# Patient Record
Sex: FEMALE | Race: WHITE | NOT HISPANIC OR LATINO | Employment: FULL TIME | URBAN - METROPOLITAN AREA
[De-identification: names, ages, dates, MRNs, and addresses within clinical notes are randomized per-mention and may not be internally consistent; named-entity substitution may affect disease eponyms.]

---

## 2018-02-20 ENCOUNTER — HOSPITAL ENCOUNTER (EMERGENCY)
Facility: HOSPITAL | Age: 73
Discharge: HOME | End: 2018-02-20
Attending: EMERGENCY MEDICINE | Admitting: EMERGENCY MEDICINE

## 2018-02-20 ENCOUNTER — APPOINTMENT (OUTPATIENT)
Dept: CT IMAGING | Facility: HOSPITAL | Age: 73
End: 2018-02-20

## 2018-02-20 ENCOUNTER — APPOINTMENT (OUTPATIENT)
Dept: GENERAL RADIOLOGY | Facility: HOSPITAL | Age: 73
End: 2018-02-20

## 2018-02-20 VITALS
OXYGEN SATURATION: 96 % | DIASTOLIC BLOOD PRESSURE: 60 MMHG | TEMPERATURE: 98.3 F | HEART RATE: 88 BPM | HEIGHT: 65 IN | RESPIRATION RATE: 18 BRPM | SYSTOLIC BLOOD PRESSURE: 138 MMHG | BODY MASS INDEX: 34.82 KG/M2 | WEIGHT: 209 LBS

## 2018-02-20 DIAGNOSIS — S80.01XA CONTUSION OF RIGHT KNEE, INITIAL ENCOUNTER: Primary | ICD-10-CM

## 2018-02-20 PROCEDURE — 73560 X-RAY EXAM OF KNEE 1 OR 2: CPT

## 2018-02-20 PROCEDURE — 99283 EMERGENCY DEPT VISIT LOW MDM: CPT

## 2018-02-20 PROCEDURE — 73030 X-RAY EXAM OF SHOULDER: CPT

## 2018-02-20 PROCEDURE — 73700 CT LOWER EXTREMITY W/O DYE: CPT

## 2018-02-20 RX ORDER — LISINOPRIL 2.5 MG/1
2.5 TABLET ORAL DAILY
COMMUNITY

## 2018-02-20 NOTE — ED PROVIDER NOTES
Subjective   HPI Comments: Liana Edwards is a 72 y.o.female who presents to the emergency department with complaints of right knee and shoulder pain after a fall that occurred three weeks ago. The patient explains that she had flipped a table over and was pushing it across the floor at home when the leg of the table folded inward. The patient fell forward and landed on her hands and knees. Since the fall, the patient has been using a cane to ambulate and has been unable to bear weight on the right leg for a prolonged period of time. When asked why the patient has not been evaluated yet for her injuries, the patient states that she was hoping her pain would go away on its own. She came here for evaluation when her symptoms did not improve. She has a knee brace in place on arrival to the emergency department. There are no other acute complaints at this time.      Patient is a 72 y.o. female presenting with fall.   History provided by:  Patient  Fall   Mechanism of injury: fall    Injury location:  Leg and shoulder/arm  Shoulder/arm injury location:  R shoulder  Leg injury location:  R knee  Incident location:  Home  Time since incident: three weeks.  Arrived directly from scene: no    Fall:     Fall occurred: pushing a table.    Impact surface:  Unable to specify    Point of impact:  Hands and knees    Entrapped after fall: no    Suspicion of alcohol use: no    Suspicion of drug use: no    Prior to arrival data:     Patient ambulatory at scene: yes      Loss of consciousness: no      Amnesic to event: no      Immobilization: The patient is wearing a knee brace on arrival.  Associated symptoms: no loss of consciousness        Review of Systems   Musculoskeletal: Positive for arthralgias (right knee and shoulder) and gait problem (secondary to pain in the right knee).   Neurological: Negative for loss of consciousness.       Past Medical History:   Diagnosis Date   • Cancer    • Hypertension        Allergies    Allergen Reactions   • Codeine Anaphylaxis       Past Surgical History:   Procedure Laterality Date   • APPENDECTOMY     • HYSTERECTOMY         History reviewed. No pertinent family history.    Social History     Social History   • Marital status:      Spouse name: N/A   • Number of children: N/A   • Years of education: N/A     Social History Main Topics   • Smoking status: Never Smoker   • Smokeless tobacco: Never Used   • Alcohol use No   • Drug use: No   • Sexual activity: Defer     Other Topics Concern   • None     Social History Narrative   • None         Objective   Physical Exam   Constitutional: She is oriented to person, place, and time. She appears well-developed and well-nourished. No distress.   HENT:   Head: Normocephalic and atraumatic.   Nose: Nose normal.   Airway patent.   Eyes: Conjunctivae are normal. No scleral icterus.   Neck: Normal range of motion and phonation normal. Neck supple.   Pulmonary/Chest: Effort normal. No respiratory distress.   Musculoskeletal: Normal range of motion. She exhibits tenderness. She exhibits no deformity.   No tenderness appreciated in the right shoulder. Normal range of motion of the right shoulder without pain. The right knee is mildly tender over the medial proximal tibia. No deformity. No swelling. No step offs. Normal range of motion of the right knee without pain. No joint laxity. No effusion.    Neurological: She is alert and oriented to person, place, and time.   Skin: Skin is warm and dry.   Psychiatric: She has a normal mood and affect. Her behavior is normal.   Nursing note and vitals reviewed.      Procedures         ED Course  ED Course     XRs negative.  CT leg negative.  Pt refused knee immobilizer, has a cane and says that is working well enough.  Patient stable on serial rechecks.  Discussed findings, concerns, plan of care, expected course, reasons to return and followup.                  MDM  Number of Diagnoses or Management  Options  Contusion of right knee, initial encounter:      Amount and/or Complexity of Data Reviewed  Tests in the radiology section of CPT®: reviewed and ordered  Independent visualization of images, tracings, or specimens: yes        Final diagnoses:   Contusion of right knee, initial encounter       Documentation assistance provided by chiki Dior.  Information recorded by the scribe was done at my direction and has been verified and validated by me.     Yessica Dior  02/20/18 2771       Rojas Rojas MD  02/20/18 2455

## 2018-05-23 ENCOUNTER — OFFICE VISIT (OUTPATIENT)
Dept: ORTHOPEDIC SURGERY | Facility: CLINIC | Age: 73
End: 2018-05-23

## 2018-05-23 VITALS
BODY MASS INDEX: 33.76 KG/M2 | WEIGHT: 202.6 LBS | HEIGHT: 65 IN | HEART RATE: 87 BPM | DIASTOLIC BLOOD PRESSURE: 71 MMHG | SYSTOLIC BLOOD PRESSURE: 136 MMHG

## 2018-05-23 DIAGNOSIS — M17.11 PRIMARY OSTEOARTHRITIS OF RIGHT KNEE: Primary | ICD-10-CM

## 2018-05-23 PROCEDURE — 99203 OFFICE O/P NEW LOW 30 MIN: CPT | Performed by: ORTHOPAEDIC SURGERY

## 2018-05-23 NOTE — PROGRESS NOTES
[facility] Note    Subjective     Chief Complaint   Patient presents with   • Right Knee - Pain        HPI    Liana Edwards is a 72 y.o. female. She presents today for evaluation of right knee pain.  She's had pain for 3 months, following an injury when she was pushing a table, and hit her knee on the table when she fell.  Pain has improved since that time.  The pain is moderate in severity, sharp and stabbing when it occurs, and associated with walking.  The pain is intermittent.  She is a cane because she is afraid it will give way on her.  She can walk without the cane.  She had x-rays obtained which showed arthritis in the knee.  She has tried physical therapy and Tylenol arthritis with improvement.      There is no problem list on file for this patient.    Past Medical History:   Diagnosis Date   • Cancer    • Hypertension       Past Surgical History:   Procedure Laterality Date   • APPENDECTOMY     • HYSTERECTOMY        Family History   Problem Relation Age of Onset   • Osteoarthritis Mother    • Cancer Father      Social History     Social History   • Marital status:      Spouse name: N/A   • Number of children: N/A   • Years of education: N/A     Occupational History   • Not on file.     Social History Main Topics   • Smoking status: Former Smoker   • Smokeless tobacco: Never Used   • Alcohol use No   • Drug use: No   • Sexual activity: Defer     Other Topics Concern   • Not on file     Social History Narrative   • No narrative on file      Current Outpatient Prescriptions on File Prior to Visit   Medication Sig Dispense Refill   • lisinopril (PRINIVIL,ZESTRIL) 2.5 MG tablet Take 2.5 mg by mouth Daily.     • [DISCONTINUED] diclofenac (VOLTAREN) 50 MG EC tablet 1 PO BID-TID prn pain 20 tablet 0     No current facility-administered medications on file prior to visit.       Allergies   Allergen Reactions   • Codeine Anaphylaxis        Review of Systems   Constitutional:  "Positive for activity change. Negative for appetite change, chills, diaphoresis, fatigue, fever and unexpected weight change.   HENT: Positive for hearing loss. Negative for congestion, dental problem, drooling, ear discharge, ear pain, facial swelling, mouth sores, nosebleeds, postnasal drip, rhinorrhea, sinus pressure, sneezing, sore throat, tinnitus, trouble swallowing and voice change.    Eyes: Negative for photophobia, pain, discharge, redness, itching and visual disturbance.   Respiratory: Negative for apnea, cough, choking, chest tightness, shortness of breath, wheezing and stridor.    Cardiovascular: Negative for chest pain, palpitations and leg swelling.   Gastrointestinal: Negative for abdominal distention, abdominal pain, anal bleeding, blood in stool, constipation, diarrhea, nausea, rectal pain and vomiting.   Endocrine: Negative for cold intolerance, heat intolerance, polydipsia, polyphagia and polyuria.   Genitourinary: Negative for decreased urine volume, difficulty urinating, dysuria, enuresis, flank pain, frequency, genital sores, hematuria and urgency.   Musculoskeletal: Positive for arthralgias. Negative for back pain, gait problem, joint swelling, myalgias, neck pain and neck stiffness.   Skin: Negative for color change, pallor, rash and wound.   Allergic/Immunologic: Negative for environmental allergies, food allergies and immunocompromised state.   Neurological: Negative for dizziness, tremors, seizures, syncope, facial asymmetry, speech difficulty, weakness, light-headedness, numbness and headaches.   Hematological: Negative for adenopathy. Does not bruise/bleed easily.   Psychiatric/Behavioral: Negative for agitation, behavioral problems, confusion, decreased concentration, dysphoric mood, hallucinations, self-injury, sleep disturbance and suicidal ideas. The patient is not nervous/anxious and is not hyperactive.         Objective      Physical Exam  /71   Pulse 87   Ht 165.1 cm (65\") "   Wt 91.9 kg (202 lb 9.6 oz)   BMI 33.71 kg/m²     Body mass index is 33.71 kg/m².    General:   Mental Status:  Alert   Appearance: Cooperative, in no acute distress   Build and Nutrition: Overweight female   Orientation: Alert and oriented to person, place and time   Posture: Normal   Gait: Normal    Integument:   Right knee: No skin lesions, no rash, no ecchymosis    Neurologic:   Sensation:    Right foot: Intact to light touch on the dorsal and plantar aspect   Motor:  Right lower extremity: 5/5 quadriceps, hamstrings, ankle dorsiflexors, and ankle plantar flexors    Vascular:   Right lower extremity: 2+ dorsalis pedis pulse, prompt capillary refill    Lower Extremities:   Right Knee:    Tenderness:  None    Effusion:  None    Swelling:  None    Crepitus:  Positive    Atrophy:  None    Range of motion:  Extension: 0°       Flexion: 120°  Instability:  No varus laxity, no valgus laxity, negative anterior drawer  Deformities:  None      Imaging/Studies  Outside radiographs of the right knee were obtained, which show calcification within the meniscus, bone-on-bone contact medial compartment, and varus alignment.  Findings are consistent with arthritis in the knee.      Assessment and Plan     Liana was seen today for pain.    Diagnoses and all orders for this visit:    Primary osteoarthritis of right knee          I reviewed my findings with patient today.  She does have right knee arthritis.  Symptoms have improved since her injury.  I suspect that she had a flareup from the fall, as well as a possible deep contusion.  She may continue the Tylenol arthritis as needed, and I will see her back in 6 months for recheck.  I did offer her an injection today, but she declined, but may want to pursue the injection the future if she has worsening symptoms.    Return in about 6 months (around 11/23/2018).      Medical Decision Making  Management Options : over-the-counter medicine  Data/Risk: independent visualization of  imaging, lab tests, or EMG/NCV      Doni Chavez MD  05/23/18  9:27 AM

## 2018-05-23 NOTE — PROGRESS NOTES
Procedure   Large Joint Arthrocentesis  Date/Time: 5/23/2018 9:22 AM  Consent given by: patient  Site marked: site marked  Timeout: Immediately prior to procedure a time out was called to verify the correct patient, procedure, equipment, support staff and site/side marked as required   Supporting Documentation  Indications: pain   Procedure Details  Location: knee -   Preparation: Patient was prepped and draped in the usual sterile fashion  Needle size: 22 G  Approach: anterolateral  Medications administered: 40 mg triamcinolone acetonide 40 MG/ML; 4 mL ropivacaine 0.5 %  Patient tolerance: patient tolerated the procedure well with no immediate complications

## 2021-02-24 ENCOUNTER — TELEPHONE (OUTPATIENT)
Dept: ORTHOPEDIC SURGERY | Facility: CLINIC | Age: 76
End: 2021-02-24

## 2023-06-29 NOTE — TELEPHONE ENCOUNTER
Dr. Chavez,    This patient was scheduled to see you on Monday but did not bring her xrays. She has the xrays now and is ready to rescedule. You are pretty booked up for a while now. Ok to overbook??? Please advise.  Delores  
PATIENT CALLED TO LET  KNOW THAT SHE HAS A COPY OF HER XRAYS. PATIENT STATES THAT SOMEONE TOLD HER TO CALL CLINIC ONCE SHE GETS THEM. SHE WOULD LIKE A CALL BACK -[phone]  
Patient called back and wanted to wait for a little bit as he is overbooked. I scheduled her for 3/24/21 per her request.  Delores  
show